# Patient Record
Sex: MALE | Race: WHITE | Employment: OTHER | URBAN - METROPOLITAN AREA
[De-identification: names, ages, dates, MRNs, and addresses within clinical notes are randomized per-mention and may not be internally consistent; named-entity substitution may affect disease eponyms.]

---

## 2018-01-01 ENCOUNTER — APPOINTMENT (OUTPATIENT)
Dept: CT IMAGING | Facility: CLINIC | Age: 73
DRG: 270 | End: 2018-01-01
Attending: STUDENT IN AN ORGANIZED HEALTH CARE EDUCATION/TRAINING PROGRAM
Payer: MEDICARE

## 2018-01-01 ENCOUNTER — APPOINTMENT (OUTPATIENT)
Dept: CARDIOLOGY | Facility: CLINIC | Age: 73
DRG: 270 | End: 2018-01-01
Attending: STUDENT IN AN ORGANIZED HEALTH CARE EDUCATION/TRAINING PROGRAM
Payer: MEDICARE

## 2018-01-01 ENCOUNTER — HOSPITAL ENCOUNTER (INPATIENT)
Facility: CLINIC | Age: 73
LOS: 1 days | DRG: 270 | End: 2018-10-29
Attending: EMERGENCY MEDICINE | Admitting: INTERNAL MEDICINE
Payer: MEDICARE

## 2018-01-01 VITALS — WEIGHT: 220.46 LBS | TEMPERATURE: 88.5 F | HEIGHT: 69 IN | BODY MASS INDEX: 32.65 KG/M2

## 2018-01-01 DIAGNOSIS — I46.9 CARDIAC ARREST (H): ICD-10-CM

## 2018-01-01 LAB
ABO + RH BLD: ABNORMAL
ABO + RH BLD: ABNORMAL
ALBUMIN SERPL-MCNC: 1.4 G/DL (ref 3.4–5)
ALP SERPL-CCNC: 67 U/L (ref 40–150)
ALT SERPL W P-5'-P-CCNC: 49 U/L (ref 0–70)
ANION GAP SERPL CALCULATED.3IONS-SCNC: 20 MMOL/L (ref 3–14)
AST SERPL W P-5'-P-CCNC: 222 U/L (ref 0–45)
B-HCG FREE SERPL-ACNC: 2 [IU]/L (ref 0.86–1.14)
BASE DEFICIT BLDA-SCNC: 22.5 MMOL/L
BASOPHILS # BLD AUTO: 0 10E9/L (ref 0–0.2)
BASOPHILS NFR BLD AUTO: 0 %
BILIRUB SERPL-MCNC: 0.3 MG/DL (ref 0.2–1.3)
BLD GP AB INVEST PLASRBC-IMP: ABNORMAL
BLD GP AB SCN SERPL QL ELUTION: ABNORMAL
BLD GP AB SCN SERPL QL: ABNORMAL
BLD PROD TYP BPU: ABNORMAL
BLD PROD TYP BPU: NORMAL
BLD UNIT ID BPU: 0
BLOOD BANK CMNT PATIENT-IMP: ABNORMAL
BLOOD PRODUCT CODE: NORMAL
BPU ID: NORMAL
BUN SERPL-MCNC: 21 MG/DL (ref 7–30)
BURR CELLS BLD QL SMEAR: ABNORMAL
CA-I BLD-MCNC: 3.6 MG/DL (ref 4.4–5.2)
CA-I BLD-SCNC: 3.3 MG/DL (ref 4.4–5.2)
CA-I BLD-SCNC: 3.4 MG/DL (ref 4.4–5.2)
CA-I BLD-SCNC: 4 MG/DL (ref 4.4–5.2)
CA-I BLD-SCNC: 4.6 MG/DL (ref 4.4–5.2)
CALCIUM SERPL-MCNC: 5.1 MG/DL (ref 8.5–10.1)
CHLORIDE SERPL-SCNC: 122 MMOL/L (ref 94–109)
CO2 BLD-SCNC: 6 MMOL/L (ref 21–28)
CO2 BLD-SCNC: 9 MMOL/L (ref 21–28)
CO2 BLDCOV-SCNC: 21 MMOL/L (ref 21–28)
CO2 BLDCOV-SCNC: 21 MMOL/L (ref 21–28)
CO2 BLDCOV-SCNC: 6 MMOL/L (ref 21–28)
CO2 BLDCOV-SCNC: 6 MMOL/L (ref 21–28)
CO2 SERPL-SCNC: 9 MMOL/L (ref 20–32)
CORTIS SERPL-MCNC: 4.8 UG/DL (ref 4–22)
CREAT SERPL-MCNC: 1.33 MG/DL (ref 0.66–1.25)
CRP SERPL-MCNC: 27 MG/L (ref 0–8)
D DIMER PPP FEU-MCNC: >20 UG/ML FEU (ref 0–0.5)
DAT C3-SP REAG RBC QL: ABNORMAL
DAT IGG-SP REAG RBC-IMP: ABNORMAL
DAT POLY-SP REAG RBC QL: ABNORMAL
DIFFERENTIAL METHOD BLD: ABNORMAL
EOSINOPHIL # BLD AUTO: 0.1 10E9/L (ref 0–0.7)
EOSINOPHIL NFR BLD AUTO: 0.9 %
ERYTHROCYTE [DISTWIDTH] IN BLOOD BY AUTOMATED COUNT: 14.7 % (ref 10–15)
ERYTHROCYTE [SEDIMENTATION RATE] IN BLOOD BY WESTERGREN METHOD: 108 MM/H (ref 0–20)
GFR SERPL CREATININE-BSD FRML MDRD: 52 ML/MIN/1.7M2
GLUCOSE BLD-MCNC: 147 MG/DL (ref 70–99)
GLUCOSE BLD-MCNC: 158 MG/DL (ref 70–99)
GLUCOSE BLD-MCNC: 210 MG/DL (ref 70–99)
GLUCOSE BLD-MCNC: 276 MG/DL (ref 70–99)
GLUCOSE BLDC GLUCOMTR-MCNC: 120 MG/DL (ref 70–99)
GLUCOSE BLDC GLUCOMTR-MCNC: 123 MG/DL (ref 70–99)
GLUCOSE SERPL-MCNC: 125 MG/DL (ref 70–99)
HCO3 BLD-SCNC: 7 MMOL/L (ref 21–28)
HCT VFR BLD AUTO: 16.4 % (ref 40–53)
HCT VFR BLD CALC: 15 %PCV (ref 40–53)
HCT VFR BLD CALC: 21 %PCV (ref 40–53)
HCT VFR BLD CALC: 33 %PCV (ref 40–53)
HCT VFR BLD CALC: <15 %PCV (ref 40–53)
HGB BLD CALC-MCNC: 11.2 G/DL (ref 13.3–17.7)
HGB BLD CALC-MCNC: 5.1 G/DL (ref 13.3–17.7)
HGB BLD CALC-MCNC: 7.1 G/DL (ref 13.3–17.7)
HGB BLD CALC-MCNC: <5 G/DL (ref 13.3–17.7)
HGB BLD-MCNC: 5 G/DL (ref 13.3–17.7)
HGB FREE PLAS-MCNC: 30 MG/DL
KCT BLD-ACNC: 209 SEC (ref 75–150)
KCT BLD-ACNC: 278 SEC (ref 75–150)
LACTATE BLD-SCNC: 11.5 MMOL/L (ref 0.7–2.1)
LACTATE BLD-SCNC: 12 MMOL/L (ref 0.7–2)
LACTATE BLD-SCNC: 9.7 MMOL/L (ref 0.7–2.1)
LDH SERPL L TO P-CCNC: 414 U/L (ref 85–227)
LYMPHOCYTES # BLD AUTO: 3.1 10E9/L (ref 0.8–5.3)
LYMPHOCYTES NFR BLD AUTO: 48.7 %
MAGNESIUM SERPL-MCNC: 1.9 MG/DL (ref 1.6–2.3)
MCH RBC QN AUTO: 29.2 PG (ref 26.5–33)
MCHC RBC AUTO-ENTMCNC: 30.5 G/DL (ref 31.5–36.5)
MCV RBC AUTO: 96 FL (ref 78–100)
METAMYELOCYTES # BLD: 0.3 10E9/L
METAMYELOCYTES NFR BLD MANUAL: 4.7 %
MONOCYTES # BLD AUTO: 0 10E9/L (ref 0–1.3)
MONOCYTES NFR BLD AUTO: 0 %
MYELOCYTES # BLD: 0.1 10E9/L
MYELOCYTES NFR BLD MANUAL: 0.9 %
NEUTROPHILS # BLD AUTO: 2.8 10E9/L (ref 1.6–8.3)
NEUTROPHILS NFR BLD AUTO: 43.9 %
NUM BPU REQUESTED: 4
O2/TOTAL GAS SETTING VFR VENT: 100 %
OXYHGB MFR BLD: 96 % (ref 92–100)
PCO2 BLD: 25 MM HG (ref 35–45)
PCO2 BLD: 28 MM HG (ref 35–45)
PCO2 BLD: 37 MM HG (ref 35–45)
PCO2 BLDV: 26 MM HG (ref 40–50)
PCO2 BLDV: 27 MM HG (ref 40–50)
PCO2 BLDV: 78 MM HG (ref 40–50)
PCO2 BLDV: 79 MM HG (ref 40–50)
PH BLD: 6.99 PH (ref 7.35–7.45)
PH BLD: 7 PH (ref 7.35–7.45)
PH BLD: 7.01 PH (ref 7.35–7.45)
PH BLDV: 6.98 PH (ref 7.32–7.43)
PH BLDV: 6.99 PH (ref 7.32–7.43)
PH BLDV: 7.04 PH (ref 7.32–7.43)
PH BLDV: 7.04 PH (ref 7.32–7.43)
PHOSPHATE SERPL-MCNC: 7.9 MG/DL (ref 2.5–4.5)
PLATELET # BLD AUTO: 26 10E9/L (ref 150–450)
PLATELET # BLD EST: ABNORMAL 10*3/UL
PO2 BLD: 312 MM HG (ref 80–105)
PO2 BLD: 379 MM HG (ref 80–105)
PO2 BLD: 402 MM HG (ref 80–105)
PO2 BLDV: 10 MM HG (ref 25–47)
PO2 BLDV: 13 MM HG (ref 25–47)
PO2 BLDV: 95 MM HG (ref 25–47)
PO2 BLDV: 96 MM HG (ref 25–47)
POIKILOCYTOSIS BLD QL SMEAR: ABNORMAL
POTASSIUM BLD-SCNC: 3.9 MMOL/L (ref 3.4–5.3)
POTASSIUM BLD-SCNC: 4 MMOL/L (ref 3.4–5.3)
POTASSIUM BLD-SCNC: 4.5 MMOL/L (ref 3.4–5.3)
POTASSIUM BLD-SCNC: 5.2 MMOL/L (ref 3.4–5.3)
POTASSIUM SERPL-SCNC: 4.6 MMOL/L (ref 3.4–5.3)
PROMYELOCYTES # BLD MANUAL: 0.1 10E9/L
PROMYELOCYTES NFR BLD MANUAL: 0.9 %
PROT SERPL-MCNC: 2.5 G/DL (ref 6.8–8.8)
RBC # BLD AUTO: 1.71 10E12/L (ref 4.4–5.9)
SAO2 % BLDA FROM PO2: 100 % (ref 92–100)
SAO2 % BLDA FROM PO2: 100 % (ref 92–100)
SAO2 % BLDV FROM PO2: 6 %
SAO2 % BLDV FROM PO2: 8 %
SAO2 % BLDV FROM PO2: 92 %
SAO2 % BLDV FROM PO2: 92 %
SODIUM BLD-SCNC: 136 MMOL/L (ref 133–144)
SODIUM BLD-SCNC: 141 MMOL/L (ref 133–144)
SODIUM BLD-SCNC: 142 MMOL/L (ref 133–144)
SODIUM BLD-SCNC: 146 MMOL/L (ref 133–144)
SODIUM SERPL-SCNC: 152 MMOL/L (ref 133–144)
SPECIMEN EXP DATE BLD: ABNORMAL
TRANSFUSION STATUS PATIENT QL: NORMAL
TROPONIN I BLD-MCNC: 6.45 UG/L (ref 0–0.08)
TROPONIN I SERPL-MCNC: 50.69 UG/L (ref 0–0.04)
WBC # BLD AUTO: 6.3 10E9/L (ref 4–11)

## 2018-01-01 PROCEDURE — 85652 RBC SED RATE AUTOMATED: CPT | Performed by: STUDENT IN AN ORGANIZED HEALTH CARE EDUCATION/TRAINING PROGRAM

## 2018-01-01 PROCEDURE — 84295 ASSAY OF SERUM SODIUM: CPT

## 2018-01-01 PROCEDURE — 33947 ECMO/ECLS INITIATION ARTERY: CPT

## 2018-01-01 PROCEDURE — 86901 BLOOD TYPING SEROLOGIC RH(D): CPT | Performed by: STUDENT IN AN ORGANIZED HEALTH CARE EDUCATION/TRAINING PROGRAM

## 2018-01-01 PROCEDURE — 27210787 ZZH MANIFOLD CR2

## 2018-01-01 PROCEDURE — 82803 BLOOD GASES ANY COMBINATION: CPT | Performed by: INTERNAL MEDICINE

## 2018-01-01 PROCEDURE — 84999 UNLISTED CHEMISTRY PROCEDURE: CPT | Performed by: STUDENT IN AN ORGANIZED HEALTH CARE EDUCATION/TRAINING PROGRAM

## 2018-01-01 PROCEDURE — 84132 ASSAY OF SERUM POTASSIUM: CPT

## 2018-01-01 PROCEDURE — 99285 EMERGENCY DEPT VISIT HI MDM: CPT | Mod: 25 | Performed by: EMERGENCY MEDICINE

## 2018-01-01 PROCEDURE — 86880 COOMBS TEST DIRECT: CPT | Performed by: STUDENT IN AN ORGANIZED HEALTH CARE EDUCATION/TRAINING PROGRAM

## 2018-01-01 PROCEDURE — B2111ZZ FLUOROSCOPY OF MULTIPLE CORONARY ARTERIES USING LOW OSMOLAR CONTRAST: ICD-10-PCS | Performed by: INTERNAL MEDICINE

## 2018-01-01 PROCEDURE — 82947 ASSAY GLUCOSE BLOOD QUANT: CPT

## 2018-01-01 PROCEDURE — 40000501 ZZHCL STATISTIC HEMATOCRIT ED POCT

## 2018-01-01 PROCEDURE — P9016 RBC LEUKOCYTES REDUCED: HCPCS | Performed by: STUDENT IN AN ORGANIZED HEALTH CARE EDUCATION/TRAINING PROGRAM

## 2018-01-01 PROCEDURE — 83615 LACTATE (LD) (LDH) ENZYME: CPT | Performed by: STUDENT IN AN ORGANIZED HEALTH CARE EDUCATION/TRAINING PROGRAM

## 2018-01-01 PROCEDURE — C1769 GUIDE WIRE: HCPCS

## 2018-01-01 PROCEDURE — 25000128 H RX IP 250 OP 636

## 2018-01-01 PROCEDURE — C1874 STENT, COATED/COV W/DEL SYS: HCPCS

## 2018-01-01 PROCEDURE — 86900 BLOOD TYPING SEROLOGIC ABO: CPT | Performed by: INTERNAL MEDICINE

## 2018-01-01 PROCEDURE — 86870 RBC ANTIBODY IDENTIFICATION: CPT | Performed by: STUDENT IN AN ORGANIZED HEALTH CARE EDUCATION/TRAINING PROGRAM

## 2018-01-01 PROCEDURE — 86901 BLOOD TYPING SEROLOGIC RH(D): CPT | Performed by: INTERNAL MEDICINE

## 2018-01-01 PROCEDURE — 27211120 ZZH VENOUS CANNULA 19-21 FRENCH

## 2018-01-01 PROCEDURE — 82330 ASSAY OF CALCIUM: CPT | Performed by: STUDENT IN AN ORGANIZED HEALTH CARE EDUCATION/TRAINING PROGRAM

## 2018-01-01 PROCEDURE — 25000128 H RX IP 250 OP 636: Performed by: STUDENT IN AN ORGANIZED HEALTH CARE EDUCATION/TRAINING PROGRAM

## 2018-01-01 PROCEDURE — 83605 ASSAY OF LACTIC ACID: CPT | Performed by: STUDENT IN AN ORGANIZED HEALTH CARE EDUCATION/TRAINING PROGRAM

## 2018-01-01 PROCEDURE — 5A1522G EXTRACORPOREAL OXYGENATION, MEMBRANE, PERIPHERAL VENO-ARTERIAL: ICD-10-PCS | Performed by: INTERNAL MEDICINE

## 2018-01-01 PROCEDURE — 82803 BLOOD GASES ANY COMBINATION: CPT

## 2018-01-01 PROCEDURE — 86900 BLOOD TYPING SEROLOGIC ABO: CPT | Performed by: STUDENT IN AN ORGANIZED HEALTH CARE EDUCATION/TRAINING PROGRAM

## 2018-01-01 PROCEDURE — 83605 ASSAY OF LACTIC ACID: CPT

## 2018-01-01 PROCEDURE — P9041 ALBUMIN (HUMAN),5%, 50ML: HCPCS

## 2018-01-01 PROCEDURE — 40000497 ZZHCL STATISTIC SODIUM ED POCT

## 2018-01-01 PROCEDURE — C9601 PERC DRUG-EL COR STENT BRAN: HCPCS | Mod: LD

## 2018-01-01 PROCEDURE — 27210806 ZZH SHEATH CR5

## 2018-01-01 PROCEDURE — 86850 RBC ANTIBODY SCREEN: CPT | Performed by: INTERNAL MEDICINE

## 2018-01-01 PROCEDURE — 86316 IMMUNOASSAY TUMOR OTHER: CPT | Performed by: STUDENT IN AN ORGANIZED HEALTH CARE EDUCATION/TRAINING PROGRAM

## 2018-01-01 PROCEDURE — 86905 BLOOD TYPING RBC ANTIGENS: CPT | Performed by: STUDENT IN AN ORGANIZED HEALTH CARE EDUCATION/TRAINING PROGRAM

## 2018-01-01 PROCEDURE — 00000146 ZZHCL STATISTIC GLUCOSE BY METER IP

## 2018-01-01 PROCEDURE — 27210760 ZZH DEVICE INFLATION CR7

## 2018-01-01 PROCEDURE — 25000132 ZZH RX MED GY IP 250 OP 250 PS 637: Mod: GY | Performed by: STUDENT IN AN ORGANIZED HEALTH CARE EDUCATION/TRAINING PROGRAM

## 2018-01-01 PROCEDURE — 27210946 ZZH KIT HC TOTES DISP CR8

## 2018-01-01 PROCEDURE — 27211089 ZZH KIT ACIST INJECTOR CR3

## 2018-01-01 PROCEDURE — 40000502 ZZHCL STATISTIC GLUCOSE ED POCT

## 2018-01-01 PROCEDURE — 027237Z DILATION OF CORONARY ARTERY, THREE ARTERIES WITH FOUR OR MORE DRUG-ELUTING INTRALUMINAL DEVICES, PERCUTANEOUS APPROACH: ICD-10-PCS | Performed by: INTERNAL MEDICINE

## 2018-01-01 PROCEDURE — 5A1935Z RESPIRATORY VENTILATION, LESS THAN 24 CONSECUTIVE HOURS: ICD-10-PCS | Performed by: EMERGENCY MEDICINE

## 2018-01-01 PROCEDURE — 40000076 ZZH STATISTIC IABP MONITORING

## 2018-01-01 PROCEDURE — 85610 PROTHROMBIN TIME: CPT

## 2018-01-01 PROCEDURE — C1725 CATH, TRANSLUMIN NON-LASER: HCPCS

## 2018-01-01 PROCEDURE — 25000128 H RX IP 250 OP 636: Performed by: INTERNAL MEDICINE

## 2018-01-01 PROCEDURE — 70450 CT HEAD/BRAIN W/O DYE: CPT

## 2018-01-01 PROCEDURE — 40000014 ZZH STATISTIC ARTERIAL MONITORING DAILY

## 2018-01-01 PROCEDURE — 99285 EMERGENCY DEPT VISIT HI MDM: CPT | Mod: Z6 | Performed by: EMERGENCY MEDICINE

## 2018-01-01 PROCEDURE — 27211118 ZZH GUIDEWIRE CARDIOHELP KIT 100-150 CM

## 2018-01-01 PROCEDURE — 25000125 ZZHC RX 250: Performed by: INTERNAL MEDICINE

## 2018-01-01 PROCEDURE — 20000004 ZZH R&B ICU UMMC

## 2018-01-01 PROCEDURE — 86316 IMMUNOASSAY TUMOR OTHER: CPT | Performed by: EMERGENCY MEDICINE

## 2018-01-01 PROCEDURE — 85014 HEMATOCRIT: CPT

## 2018-01-01 PROCEDURE — 27211402 ZZH SENSOR NIRS OXIMETER, ADULT

## 2018-01-01 PROCEDURE — 93454 CORONARY ARTERY ANGIO S&I: CPT

## 2018-01-01 PROCEDURE — 82330 ASSAY OF CALCIUM: CPT

## 2018-01-01 PROCEDURE — 86850 RBC ANTIBODY SCREEN: CPT | Performed by: STUDENT IN AN ORGANIZED HEALTH CARE EDUCATION/TRAINING PROGRAM

## 2018-01-01 PROCEDURE — 85014 HEMATOCRIT: CPT | Performed by: INTERNAL MEDICINE

## 2018-01-01 PROCEDURE — 84484 ASSAY OF TROPONIN QUANT: CPT

## 2018-01-01 PROCEDURE — 82330 ASSAY OF CALCIUM: CPT | Performed by: INTERNAL MEDICINE

## 2018-01-01 PROCEDURE — 33949 ECMO/ECLS DAILY MGMT ARTERY: CPT

## 2018-01-01 PROCEDURE — 84484 ASSAY OF TROPONIN QUANT: CPT | Performed by: STUDENT IN AN ORGANIZED HEALTH CARE EDUCATION/TRAINING PROGRAM

## 2018-01-01 PROCEDURE — 27211165 ZZH HYPOTHERMIC CATHETER/KIT CR13

## 2018-01-01 PROCEDURE — 80053 COMPREHEN METABOLIC PANEL: CPT | Performed by: STUDENT IN AN ORGANIZED HEALTH CARE EDUCATION/TRAINING PROGRAM

## 2018-01-01 PROCEDURE — 74176 CT ABD & PELVIS W/O CONTRAST: CPT

## 2018-01-01 PROCEDURE — C1894 INTRO/SHEATH, NON-LASER: HCPCS

## 2018-01-01 PROCEDURE — 27211133 ZZH PAD DEFIB QUICK CR2

## 2018-01-01 PROCEDURE — 82805 BLOOD GASES W/O2 SATURATION: CPT | Performed by: STUDENT IN AN ORGANIZED HEALTH CARE EDUCATION/TRAINING PROGRAM

## 2018-01-01 PROCEDURE — 84132 ASSAY OF SERUM POTASSIUM: CPT | Performed by: INTERNAL MEDICINE

## 2018-01-01 PROCEDURE — 5A02210 ASSISTANCE WITH CARDIAC OUTPUT USING BALLOON PUMP, CONTINUOUS: ICD-10-PCS | Performed by: INTERNAL MEDICINE

## 2018-01-01 PROCEDURE — 84100 ASSAY OF PHOSPHORUS: CPT | Performed by: STUDENT IN AN ORGANIZED HEALTH CARE EDUCATION/TRAINING PROGRAM

## 2018-01-01 PROCEDURE — 40000081 ZZH STATISTIC INSERT IABP

## 2018-01-01 PROCEDURE — A9270 NON-COVERED ITEM OR SERVICE: HCPCS | Mod: GY | Performed by: STUDENT IN AN ORGANIZED HEALTH CARE EDUCATION/TRAINING PROGRAM

## 2018-01-01 PROCEDURE — 82947 ASSAY GLUCOSE BLOOD QUANT: CPT | Performed by: INTERNAL MEDICINE

## 2018-01-01 PROCEDURE — 27211184 ZZH CARDIOHELP CIRCUIT

## 2018-01-01 PROCEDURE — 83051 HEMOGLOBIN PLASMA: CPT | Performed by: STUDENT IN AN ORGANIZED HEALTH CARE EDUCATION/TRAINING PROGRAM

## 2018-01-01 PROCEDURE — 27211119 ZZH ARTERIAL CANNULA 15-17 FRENCH

## 2018-01-01 PROCEDURE — 27211163 ZZH HYPOTHERMIC CATHETER/KIT CR19

## 2018-01-01 PROCEDURE — C1757 CATH, THROMBECTOMY/EMBOLECT: HCPCS

## 2018-01-01 PROCEDURE — C1887 CATHETER, GUIDING: HCPCS

## 2018-01-01 PROCEDURE — 37799 UNLISTED PX VASCULAR SURGERY: CPT

## 2018-01-01 PROCEDURE — 99238 HOSP IP/OBS DSCHRG MGMT 30/<: CPT | Mod: 25 | Performed by: INTERNAL MEDICINE

## 2018-01-01 PROCEDURE — 33952 ECMO/ECLS INSJ PRPH CANNULA: CPT

## 2018-01-01 PROCEDURE — 33967 INSERT I-AORT PERCUT DEVICE: CPT

## 2018-01-01 PROCEDURE — 40000281 ZZH STATISTIC TRANSPORT TIME EA 15 MIN

## 2018-01-01 PROCEDURE — C9600 PERC DRUG-EL COR STENT SING: HCPCS | Mod: LD

## 2018-01-01 PROCEDURE — 27210762 ZZH DEVICE SUTURELESS SECUREMENT EA CR2

## 2018-01-01 PROCEDURE — 85025 COMPLETE CBC W/AUTO DIFF WBC: CPT | Performed by: STUDENT IN AN ORGANIZED HEALTH CARE EDUCATION/TRAINING PROGRAM

## 2018-01-01 PROCEDURE — 3E043XZ INTRODUCTION OF VASOPRESSOR INTO CENTRAL VEIN, PERCUTANEOUS APPROACH: ICD-10-PCS | Performed by: INTERNAL MEDICINE

## 2018-01-01 PROCEDURE — 86860 RBC ANTIBODY ELUTION: CPT | Performed by: STUDENT IN AN ORGANIZED HEALTH CARE EDUCATION/TRAINING PROGRAM

## 2018-01-01 PROCEDURE — 27210136 ZZH KIT CATH ARTERIAL EXT SUPPLY

## 2018-01-01 PROCEDURE — 84295 ASSAY OF SERUM SODIUM: CPT | Performed by: INTERNAL MEDICINE

## 2018-01-01 PROCEDURE — 94002 VENT MGMT INPAT INIT DAY: CPT

## 2018-01-01 PROCEDURE — 27210305 ZZH CATH BALLOON IABP

## 2018-01-01 PROCEDURE — 85347 COAGULATION TIME ACTIVATED: CPT

## 2018-01-01 PROCEDURE — 27211329 ZZ H DEVICE OXYGENATOR QUADROX ECMO, ADULT

## 2018-01-01 PROCEDURE — 85379 FIBRIN DEGRADATION QUANT: CPT | Performed by: STUDENT IN AN ORGANIZED HEALTH CARE EDUCATION/TRAINING PROGRAM

## 2018-01-01 PROCEDURE — 36620 INSERTION CATHETER ARTERY: CPT

## 2018-01-01 PROCEDURE — 27210998 ZZH ACCESS HEART CATH CR6

## 2018-01-01 PROCEDURE — 40000498 ZZHCL STATISTIC POTASSIUM ED POCT

## 2018-01-01 PROCEDURE — 40000275 ZZH STATISTIC RCP TIME EA 10 MIN

## 2018-01-01 PROCEDURE — 99223 1ST HOSP IP/OBS HIGH 75: CPT | Mod: GC | Performed by: INTERNAL MEDICINE

## 2018-01-01 PROCEDURE — 86140 C-REACTIVE PROTEIN: CPT | Performed by: STUDENT IN AN ORGANIZED HEALTH CARE EDUCATION/TRAINING PROGRAM

## 2018-01-01 PROCEDURE — 83735 ASSAY OF MAGNESIUM: CPT | Performed by: STUDENT IN AN ORGANIZED HEALTH CARE EDUCATION/TRAINING PROGRAM

## 2018-01-01 PROCEDURE — 82533 TOTAL CORTISOL: CPT | Performed by: STUDENT IN AN ORGANIZED HEALTH CARE EDUCATION/TRAINING PROGRAM

## 2018-01-01 RX ORDER — NICOTINE POLACRILEX 4 MG
15-30 LOZENGE BUCCAL
Status: DISCONTINUED | OUTPATIENT
Start: 2018-01-01 | End: 2018-01-01 | Stop reason: HOSPADM

## 2018-01-01 RX ORDER — ALBUMIN, HUMAN INJ 5% 5 %
SOLUTION INTRAVENOUS
Status: COMPLETED
Start: 2018-01-01 | End: 2018-01-01

## 2018-01-01 RX ORDER — HEPARIN SODIUM (PORCINE) LOCK FLUSH IV SOLN 100 UNIT/ML 100 UNIT/ML
5-10 SOLUTION INTRAVENOUS EVERY 30 MIN PRN
Status: DISCONTINUED | OUTPATIENT
Start: 2018-01-01 | End: 2018-01-01 | Stop reason: HOSPADM

## 2018-01-01 RX ORDER — ASPIRIN 325 MG
325 TABLET ORAL
Status: COMPLETED | OUTPATIENT
Start: 2018-01-01 | End: 2018-01-01

## 2018-01-01 RX ORDER — MIDAZOLAM (PF) 1 MG/ML IN 0.9 % SODIUM CHLORIDE INTRAVENOUS SOLUTION
1-8 CONTINUOUS
Status: DISCONTINUED | OUTPATIENT
Start: 2018-01-01 | End: 2018-01-01 | Stop reason: HOSPADM

## 2018-01-01 RX ORDER — PIPERACILLIN SODIUM, TAZOBACTAM SODIUM 3; .375 G/15ML; G/15ML
3.38 INJECTION, POWDER, LYOPHILIZED, FOR SOLUTION INTRAVENOUS EVERY 6 HOURS
Status: DISCONTINUED | OUTPATIENT
Start: 2018-01-01 | End: 2018-01-01 | Stop reason: HOSPADM

## 2018-01-01 RX ORDER — PHENYLEPHRINE HCL IN 0.9% NACL 1 MG/10 ML
20-100 SYRINGE (ML) INTRAVENOUS
Status: DISCONTINUED | OUTPATIENT
Start: 2018-01-01 | End: 2018-01-01 | Stop reason: HOSPADM

## 2018-01-01 RX ORDER — ASPIRIN 81 MG/1
81 TABLET, CHEWABLE ORAL DAILY
Status: DISCONTINUED | OUTPATIENT
Start: 2018-01-01 | End: 2018-01-01 | Stop reason: HOSPADM

## 2018-01-01 RX ORDER — IOPAMIDOL 755 MG/ML
320 INJECTION, SOLUTION INTRAVASCULAR ONCE
Status: COMPLETED | OUTPATIENT
Start: 2018-01-01 | End: 2018-01-01

## 2018-01-01 RX ORDER — FENTANYL CITRATE 50 UG/ML
50 INJECTION, SOLUTION INTRAMUSCULAR; INTRAVENOUS EVERY 30 MIN PRN
Status: DISCONTINUED | OUTPATIENT
Start: 2018-01-01 | End: 2018-01-01 | Stop reason: HOSPADM

## 2018-01-01 RX ORDER — CLOPIDOGREL BISULFATE 75 MG/1
75 TABLET ORAL
Status: DISCONTINUED | OUTPATIENT
Start: 2018-01-01 | End: 2018-01-01 | Stop reason: HOSPADM

## 2018-01-01 RX ORDER — DEXTROSE MONOHYDRATE 25 G/50ML
25-50 INJECTION, SOLUTION INTRAVENOUS
Status: DISCONTINUED | OUTPATIENT
Start: 2018-01-01 | End: 2018-01-01 | Stop reason: HOSPADM

## 2018-01-01 RX ORDER — SODIUM NITROPRUSSIDE 25 MG/ML
500 INJECTION INTRAVENOUS EVERY 5 MIN PRN
Status: DISCONTINUED | OUTPATIENT
Start: 2018-01-01 | End: 2018-01-01 | Stop reason: HOSPADM

## 2018-01-01 RX ORDER — IPRATROPIUM BROMIDE AND ALBUTEROL SULFATE 2.5; .5 MG/3ML; MG/3ML
3 SOLUTION RESPIRATORY (INHALATION) EVERY 4 HOURS
Status: DISCONTINUED | OUTPATIENT
Start: 2018-01-01 | End: 2018-01-01

## 2018-01-01 RX ORDER — MAGNESIUM SULFATE HEPTAHYDRATE 500 MG/ML
1-2 INJECTION, SOLUTION INTRAMUSCULAR; INTRAVENOUS EVERY 5 MIN PRN
Status: DISCONTINUED | OUTPATIENT
Start: 2018-01-01 | End: 2018-01-01 | Stop reason: HOSPADM

## 2018-01-01 RX ORDER — METHYLPREDNISOLONE SODIUM SUCCINATE 125 MG/2ML
125 INJECTION, POWDER, LYOPHILIZED, FOR SOLUTION INTRAMUSCULAR; INTRAVENOUS
Status: DISCONTINUED | OUTPATIENT
Start: 2018-01-01 | End: 2018-01-01 | Stop reason: HOSPADM

## 2018-01-01 RX ORDER — HEPARIN SODIUM (PORCINE) LOCK FLUSH IV SOLN 100 UNIT/ML 100 UNIT/ML
5-10 SOLUTION INTRAVENOUS EVERY 30 MIN PRN
Status: DISCONTINUED | OUTPATIENT
Start: 2018-01-01 | End: 2018-01-01

## 2018-01-01 RX ORDER — POTASSIUM CHLORIDE 29.8 MG/ML
20 INJECTION INTRAVENOUS
Status: DISCONTINUED | OUTPATIENT
Start: 2018-01-01 | End: 2018-01-01 | Stop reason: HOSPADM

## 2018-01-01 RX ORDER — HEPARIN SODIUM 10000 [USP'U]/100ML
100-1000 INJECTION, SOLUTION INTRAVENOUS CONTINUOUS PRN
Status: DISCONTINUED | OUTPATIENT
Start: 2018-01-01 | End: 2018-01-01 | Stop reason: HOSPADM

## 2018-01-01 RX ORDER — ARGATROBAN 1 MG/ML
150 INJECTION, SOLUTION INTRAVENOUS
Status: DISCONTINUED | OUTPATIENT
Start: 2018-01-01 | End: 2018-01-01 | Stop reason: HOSPADM

## 2018-01-01 RX ORDER — ARGATROBAN 1 MG/ML
350 INJECTION, SOLUTION INTRAVENOUS
Status: DISCONTINUED | OUTPATIENT
Start: 2018-01-01 | End: 2018-01-01 | Stop reason: HOSPADM

## 2018-01-01 RX ORDER — LIDOCAINE 40 MG/G
CREAM TOPICAL
Status: DISCONTINUED | OUTPATIENT
Start: 2018-01-01 | End: 2018-01-01 | Stop reason: HOSPADM

## 2018-01-01 RX ORDER — LIDOCAINE HYDROCHLORIDE ANHYDROUS AND DEXTROSE MONOHYDRATE .8; 5 G/100ML; G/100ML
1-4 INJECTION, SOLUTION INTRAVENOUS CONTINUOUS
Status: DISCONTINUED | OUTPATIENT
Start: 2018-01-01 | End: 2018-01-01 | Stop reason: HOSPADM

## 2018-01-01 RX ORDER — ASPIRIN 81 MG/1
81-324 TABLET, CHEWABLE ORAL
Status: DISCONTINUED | OUTPATIENT
Start: 2018-01-01 | End: 2018-01-01 | Stop reason: HOSPADM

## 2018-01-01 RX ORDER — CALCIUM CHLORIDE 100 MG/ML
1 INJECTION INTRAVENOUS; INTRAVENTRICULAR EVERY 10 MIN PRN
Status: DISCONTINUED | OUTPATIENT
Start: 2018-01-01 | End: 2018-01-01 | Stop reason: HOSPADM

## 2018-01-01 RX ORDER — NALOXONE HYDROCHLORIDE 0.4 MG/ML
.1-.4 INJECTION, SOLUTION INTRAMUSCULAR; INTRAVENOUS; SUBCUTANEOUS
Status: DISCONTINUED | OUTPATIENT
Start: 2018-01-01 | End: 2018-01-01 | Stop reason: HOSPADM

## 2018-01-01 RX ORDER — HEPARIN SODIUM 1000 [USP'U]/ML
1000-10000 INJECTION, SOLUTION INTRAVENOUS; SUBCUTANEOUS EVERY 5 MIN PRN
Status: DISCONTINUED | OUTPATIENT
Start: 2018-01-01 | End: 2018-01-01 | Stop reason: HOSPADM

## 2018-01-01 RX ORDER — CLOPIDOGREL BISULFATE 75 MG/1
300-600 TABLET ORAL
Status: DISCONTINUED | OUTPATIENT
Start: 2018-01-01 | End: 2018-01-01 | Stop reason: HOSPADM

## 2018-01-01 RX ORDER — ADENOSINE 3 MG/ML
12-12000 INJECTION, SOLUTION INTRAVENOUS
Status: DISCONTINUED | OUTPATIENT
Start: 2018-01-01 | End: 2018-01-01 | Stop reason: HOSPADM

## 2018-01-01 RX ORDER — MAGNESIUM SULFATE HEPTAHYDRATE 40 MG/ML
4 INJECTION, SOLUTION INTRAVENOUS EVERY 4 HOURS PRN
Status: DISCONTINUED | OUTPATIENT
Start: 2018-01-01 | End: 2018-01-01 | Stop reason: HOSPADM

## 2018-01-01 RX ORDER — ALBUTEROL SULFATE 90 UG/1
6 AEROSOL, METERED RESPIRATORY (INHALATION) EVERY 4 HOURS
Status: DISCONTINUED | OUTPATIENT
Start: 2018-01-01 | End: 2018-01-01 | Stop reason: HOSPADM

## 2018-01-01 RX ORDER — ATROPINE SULFATE 0.1 MG/ML
.5-1 INJECTION INTRAVENOUS
Status: DISCONTINUED | OUTPATIENT
Start: 2018-01-01 | End: 2018-01-01 | Stop reason: HOSPADM

## 2018-01-01 RX ORDER — CLOPIDOGREL BISULFATE 75 MG/1
75 TABLET ORAL DAILY
Status: DISCONTINUED | OUTPATIENT
Start: 2018-10-30 | End: 2018-01-01

## 2018-01-01 RX ORDER — MEPERIDINE HYDROCHLORIDE 50 MG/ML
25-50 INJECTION INTRAMUSCULAR; INTRAVENOUS; SUBCUTANEOUS
Status: DISCONTINUED | OUTPATIENT
Start: 2018-01-01 | End: 2018-01-01

## 2018-01-01 RX ORDER — CALCIUM CHLORIDE 100 MG/ML
1 INJECTION INTRAVENOUS; INTRAVENTRICULAR EVERY 10 MIN PRN
Status: DISCONTINUED | OUTPATIENT
Start: 2018-01-01 | End: 2018-01-01

## 2018-01-01 RX ADMIN — VASOPRESSIN 4 UNITS/HR: 20 INJECTION INTRAVENOUS at 03:41

## 2018-01-01 RX ADMIN — ALBUMIN HUMAN 87.5 G: 0.05 INJECTION, SOLUTION INTRAVENOUS at 02:45

## 2018-01-01 RX ADMIN — EPINEPHRINE 0.3 MCG/KG/MIN: 1 INJECTION PARENTERAL at 03:41

## 2018-01-01 RX ADMIN — IOPAMIDOL 320 ML: 755 INJECTION, SOLUTION INTRAVASCULAR at 01:45

## 2018-01-01 RX ADMIN — SODIUM CHLORIDE 3000 ML: 9 INJECTION, SOLUTION INTRAVENOUS at 03:30

## 2018-01-01 RX ADMIN — NOREPINEPHRINE BITARTRATE 0.4 MCG/KG/MIN: 1 INJECTION INTRAVENOUS at 03:41

## 2018-01-01 RX ADMIN — TICAGRELOR 180 MG: 90 TABLET ORAL at 01:26

## 2018-01-01 RX ADMIN — ASPIRIN 325 MG ORAL TABLET 325 MG: 325 PILL ORAL at 01:26

## 2018-01-01 RX ADMIN — PHENYLEPHRINE HYDROCHLORIDE 3 MCG/KG/MIN: 10 INJECTION, SOLUTION INTRAMUSCULAR; INTRAVENOUS; SUBCUTANEOUS at 03:44

## 2018-01-01 ASSESSMENT — ACTIVITIES OF DAILY LIVING (ADL): ADLS_ACUITY_SCORE: 20

## 2018-10-29 PROBLEM — I46.9 CARDIAC ARREST (H): Status: ACTIVE | Noted: 2018-01-01

## 2018-10-29 NOTE — H&P
[facility]  Cardiac ICU H&P  2018          H&P:   Iker Machuca is a 73 year old male who was admitted on 10/28/2018 following a witnessed cardiac arrest in his home.  His wife saw him collapse and called 911.  He hit his head when he fell and has a laceration.  EMS arrived, found him in PEA and initiated CPR with Jericho; he converted to VF, was shocked 5 times, and converted to asystole in which he has remained.  EMS had placed an IO in left tibia as well as an orophagyngeal I-Gel airway.    Witnessed arest (y/n): yes  Home or public location (y/n): home  Bystander CPR (y/n):  Time of 911 call:   Initial rhythm: PEA -> VF -> shock -> asystole  Did they have intermittent ROSC (y/n): no  # of shocks: 5  Epi:  4 mg  Amio:  450 mg  Bicarb: 2 amps  EtCO2 en route: ?  O2 sat en route: 79    Initial rhythm in the cath lab: asystole  First AB.97/52.8/67/12.2  ECMO cannula size: 17 Fr arterial & 25 Fr venous  Meds given in the cath lab:    Coronary angiography demonstrated complete occlusion of the proximal LAD, RCA, and LCx (appeared to be a ).  Following thrombus aspiration, stents were placed in the proximal LAD (3.0x32), first diagonal (2.5x20), proximal RCA (2.0x20) and mid RCA (2.0x30).  Following PCI the LAD had slow flow.    Despite coronary reperfusion, a perfusing rhythm could not be obtained and the patient remained in asystole throughout the case and afterward.  We spoke with the family, clearly explained his clinical condition & the supportive measures taken, and recommended cessation of resuscitative efforts due to his dismal prognosis.  His son appeared to understand but his wife, still grappling with the evening's events, wanted to continue supportive care through the night and decide in the morning.         Medications:   I have reviewed this patient's current medications.    No past medical/surgical/family history is available.  He is .         Review  of Systems:   Could not be obtained due to patient's unresponsiveness.         Physical Exam:   Exam and Labs by System  Neuro: not on sedation or paralytics  Exam:   Sedated; orally intubated; bleeding from right orbita; cannot open eyelids due to edema       Cardiovascular:    Meds:  ASA 81mg Qday, ticagrelor 90 mg BID, heparin gtt for ECMO  Exam:    No heart sounds.  Cool extremities.  No peripheral edema.     Pulm:  Crackles in both anterior lung fields.  Subcutaneous hemorrhage is visible under the site of Jericho CPR.  Vent Settings:  FiO2 (%): 100 %  Resp: 12    Recent Labs  Lab 10/29/18  0214   PH 6.99*   PCO2 28*   PO2 312*   HCO3 7*   O2PER 100.0       Renal:        Recent Labs  Lab 10/28/18  2138      POTASSIUM 3.9   *     GI:   Meds: Protonix 40mg IV Qday  Exam:    Fernandez in place  Distended  Absent bowel sounds     Heme/ID: Bleeding from posterior cranial laceration, right orbit, and nares.  Recent Labs   Lab Test  10/28/18   2138   HGB  11.2*     Endo:   Meds:  Recent Labs  Lab 10/28/18  2138   *               Data:     CMP  Recent Labs  Lab 10/28/18  2138      POTASSIUM 3.9   *     CBC  Recent Labs  Lab 10/28/18  2138   HGB 11.2*     INR 2.0    CT head, chest, abdomen, and pelvis are performed; read is pending.  I reviewed the images briefly with the radiologist.  On a first glance he reported evidence of cerebral edema diffusely without intracranial hemorrhage; small pleural effusions; moderate pericardial effusion; bilateral pulmonary edema; significant gaseous distension of the stomach and bowel, no retroperitoneal or intra-abdominal hemorrhage, and appropriate positioning of the balloon pump and ECMO cannulae.             Assessment and Plan:   Iker Machuca is a 73 year old male who was admitted on 10/28/2018.  On the basis of persistent asystole despite revascularization, severe hypothermia despite restoration of circulation, and persistent circulatory shock his  prognosis is abysmal.  His wife is struggling to cope and has requested that we continue supportive care through the night.    Neurology: Intubated, sedated, paralyzed. He was himself hypothermic to 30 degrees and actually had to be warmed to maintain a goal of 34-36 degrees.  -- hold sedation unless he becomes more alert and appears uncomfortable   Cardiovascular / Hemodynamics: Refractory VF arrest most presumed to be secondary to ACS, with coronary angiography demonstrating complete occlusion of the LAD, LCx, and RCA.  JACKELIN to LAD, RCA, and first diagonal.  Peripheral V-A ECMO inserted for cardiogenic shock.  TTE: ordered  EKG: asystole  --wean pressors if able  --echo tomorrow pending ongoing discussion of goals/prognosis  --continue ASA 81mg and ticagrelor 90mg BID  --hold temp at 34 given bleeding  --ACT goal 180-200  --hold lipitor for now given likely hepatic injury during arrest  --hold ACE/ARB for now given likely reduced renal fxn after arrest  --holding beta blocker given shock   Pulmonary: Respiratory failure due to shock and unresponsiveness  Oral ETT.  Will wean vent requirements as able.  CXR: Lines in stable position.  --daily CXR  --Q2h ABGs for now  --scheduled duonebs if signs of lung dz, currently PRN   GI and Nutrition: No known medical hx.   --monitor LFTs  --NPO while cooled - nutrition consult pending feeding tube placement once he is warmed   --bowel regimen - on hold for now due to hypothermia  --GI Prophylaxis: PPI IV   Renal, Fluid and Electrolytes: --monitor urine output  --maintain K>3 and Mg>2    Infectious Disease: No signs of infection. Leukocytosis c/w arrest. Blood cultures collected.   --vancomycin/zosyn x5 days for ECMO  --daily blood cultures  --monitor for signs of infection given cooling, lines, and leukocytosis   Hematology and Oncology: Receiving heparin for ECMO and ASA/ticagrelor for JACKELIN.   --cryo PRN fibrinogen < 200; FFP for INR >2  --Transfuse for Hgb<10  --heparin gtt  for ECMO with ACT goal 160-180  --US LE w/ arterial duplex per ECMO protocol   --DVT PPX: Heparin as above   Endocrinology: No known medical history. BG elevated.  --insulin gtt  --stress dose steroids due to persistent hypotension - hydrocort 50 mg IV Q8h  --f/u HgbA1c    Lines: R femoral arterial and venous ECMO cannulae October 28, 2018  L femoral arterial line October 28, 2018  Radial arterial line October 28, 2018  ETT October 28, 2018  Fernandez catheter October 28, 2018  OG tube October 28, 2018  Restraint: needed    Current lines are required for patient management     Family was updated by Dr. Jackman.    I discussed the patient with Dr. Ashish Jackman.    Ender Barr MD  Cardiovascular disease fellow    October 28, 2018    I have seen and examined the patient and agree with the finding and plan.       Ashish Jackman MD  Cardiology-[facility]  [phone]

## 2018-10-29 NOTE — PLAN OF CARE
Problem: Patient Care Overview  Goal: Plan of Care/Patient Progress Review  Outcome: Declining  Admitted/transferred cath lab   Reason for admission/transfer: witnessed PEA arrest with fall, EMS VFib and asystole see notes, cannulated for  ECMO   Patient status upon admission/transfer: critical; bleeding with low flow on circuit and multiple vasoactive medications  Interventions: 1.75L Albumin and multiple NS bolus given through pressure bags, vasoactive medications increased,  blood product orders released and sent for, care discussed with family by team bedside  Plan: transition to less aggressive cares while attempting to maintain current critical status while awaiting family arrivals to say farayaan   2 RN skin assessment: completed by not fully completed  Upon arrival due to critical condition of patient and decision to transition to comfort and withdrawal aggressive cares  Result of skin assessment and interventions/actions: patient was bathed in preporation for family to view and be bedside, multiple lacerations, bruising, lines, and bleeding sites present  Height, weight, drug calc weight: done   Patient belongings: wedding ring sent with wife, clothing cut off in procedural area  MDRO education (if applicable): unknown , mrsa swab sent , cultures not drawn prior to death    Patient was pronounced dead at 0419, October 29, 2018 see Physicians note . Wife Rae and other family bedside with  Dr. Schultz of cardiology present. [employer] contacted reference number [id number]. Wife'sRae, contact information [phone]. Son'sRubin, contact information [phone]. Wife and family bedside saying keshav presently. Family went home, body sent with security at 0655.    Patient name update last name Seamus, middle name Brigette.      Please see MAR, flow sheets, and remainder of chart for further details. .

## 2018-10-29 NOTE — PROGRESS NOTES
ECLS Cannulation Note:    Date on: 10/28/2018  Time on: 21:59 PM  Surgeon: ANAHY    Arterial Cannula: 17 Fr. In the Right Femoral Artery      Venous Cannula: 25 Fr. In the Right Femoral Vein      ECMO components include [employer] Lot # [id number], Quadrox Oxygenator- Lot # [id number]    Cannulation was performed in the Cath Lab, placement was verified by fluoroscopy, cannulas are in good position.  ISTAT and blood cooler are at bedside.     Rene Blake, ROBIN  10/29/2018 3:22 AM

## 2018-10-29 NOTE — PROGRESS NOTES
[facility], [place]  Procedure Note           Intubation:       Iker Machuca  MRN# [id number]   October 28, 2018, 9:51 PM Indication: Cardiac arrest           Patient intubated at: October 28, 2018, 9:51 PM   Family informed of: Why intubation was required   Informed consent: Obtained   Cervical spine: Was stabilized during the procedure   Sedative medication: Was administered during the procedure   Technique used: Fiberoptic visualization with GlideScope   Endotracheal tube size: 8.0 cm with cuff   Number of attempts: 1   Placement confirmed by: Auscultation of bilateral breath sounds  Visualization of bilateral chest wall rise  End-tidal CO2 monitor   ET tube repositioning: Was not performed   Tube secured at: 27 cm      This procedure was performed without difficulty and he tolerated the procedure well with no complications.      Recorded by Rene Blake

## 2018-10-29 NOTE — ED TRIAGE NOTES
Pt BIBA after a fall and cardiac arrest around 2035. Original rhythm was PEA that turned into Vfib. He received 5 shocks, 450mg amio, 4 epi. All rhythm checks by EMS was asystole. . Per EMS pt has lac on head. Original rhythm check in ED was asystole.

## 2018-10-29 NOTE — ED PROVIDER NOTES
[place] EMERGENCY DEPARTMENT ([facility])  10/28/18 ED 1 9:31 PM  History     Chief Complaint   Patient presents with     Cardiac Arrest     The history is provided by the patient and medical records.     Iker Machuca is a 73 year old male who presents via EMS for cardiac arrest. He is new to the [place] system, has never been here in the past. History provided by [facility] EMS crew as patient is obtunded.  Patient was at home with his wife when he suffered a witnessed loss of consciousness and fall.  He fell backwards, striking the back of his head and sustaining a cut.  Wife called 911 and paramedics arrived and initiated Jericho CPR right away.  EKG was notable for PEA rhythm which went into V. fib.  He was given 5 shocks, 450 of amiodarone 4 epinephrine and 2 bicarb.  They performed 5 rhythm checks on the way to our ED and each time is notable for asystole, last rhythm check was at 2128 with asystole.  An IO was placed in his left tibia as well as a second line in his right antecubital 18-gauge.  An i-Gel oropharyngeal area was placed as well. Blood sugar was 246 They contacted Dr. Almeida prior to transporting patient here for further evaluation.  Patient's wife and son are en route as well.  Son is a [place] .    I have reviewed the Medications, Allergies, Past Medical and Surgical History, and Social History in the [facility] system.  PAST MEDICAL HISTORY: History reviewed. No pertinent past medical history.    PAST SURGICAL HISTORY: History reviewed. No pertinent surgical history.    FAMILY HISTORY: No family history on file.    SOCIAL HISTORY:   Social History   Substance Use Topics     Smoking status: Unknown If Ever Smoked     Smokeless tobacco: Never Used     Alcohol use Not on file       There are no discharge medications for this patient.       Not on File     Review of Systems   Unable to perform ROS: Acuity of condition       Physical Exam   BP:  (RANI)  Heart Rate: (!)  204 (with jericho)  SpO2:  (RANI)      Physical Exam   Constitutional:   CPR in progress with Jericho.  Igel in place  No spontaneous respirations  No pulse   Eyes:   No response   Cardiovascular:   No pulse   Pulmonary/Chest:   apnic   Neurological:   No reflexes       ED Course     ED Course     Procedures         9:36 PM pulse check, no pulse       Critical Care time:  was 20 minutes for this patient excluding procedures.            Labs Ordered and Resulted from Time of ED Arrival Up to the Time of Departure from the ED   GLUCOSE BY METER - Abnormal; Notable for the following:        Result Value    Glucose 123 (*)     All other components within normal limits   ISTAT GASES ELEC ICA GLUC IOANA POCT - Abnormal; Notable for the following:     Ph Venous 7.04 (*)     PCO2 Venous 78 (*)     PO2 Venous 13 (*)     Glucose 276 (*)     Hemoglobin 11.2 (*)     Hematocrit - POCT 33 (*)     All other components within normal limits   ISTAT  GASES LACTATE IOANA POCT - Abnormal; Notable for the following:     Ph Venous 7.04 (*)     PCO2 Venous 79 (*)     PO2 Venous 10 (*)     Lactic Acid 11.5 (*)     All other components within normal limits   ISTAT INR POCT - Abnormal; Notable for the following:     ISTAT INR 2.0 (*)     All other components within normal limits   TROPONIN POCT - Abnormal; Notable for the following:     Troponin I 6.45 (*)     All other components within normal limits            Assessments & Plan (with Medical Decision Making)   This is a 73-year-old male brought in by [facility] as a cardiac arrest.  Here he is found to be apneic and pulseless with a asystolic rhythm.  He had already been shocked 5 times in the field.  Here in the emergency room his I job was replaced with an endotracheal tube.  Labs were drawn and patient was then emergently sent to the Cath Lab for ECMO management.    I have reviewed the nursing notes.    I have reviewed the findings, diagnosis, plan and need for follow up with the  patient.    There are no discharge medications for this patient.      Final diagnoses:   None     Chantal HYATT, am serving as a trained medical scribe to document services personally performed by Sean Baez MD based on the provider's statements to me on October 28, 2018.  This document has been checked and approved by the attending provider.    ISean MD, was physically present and have reviewed and verified the accuracy of this note documented by Chantal Ellsworth, medical scribe.     10/28/2018   [facility], [place], EMERGENCY DEPARTMENT     Sean Baez MD  10/29/18 0036

## 2018-10-29 NOTE — DISCHARGE SUMMARY
[facility], [place]    Death Summary  Cardiology: [facility]SI service    Date of Admission:  10/28/2018  Date of Death:         10/29/2018  Provider Completing Death Summary: Ender Barr    Discharge Diagnoses   Cardiac arrest (H)    History of Present Illness   Iker Machuca is an 73 year old male hospitalized after attempted resuscitation of a witnessed PEA -> VF -> asystolic cardiac arrest.  His wife saw him collapse and called 911.  Paramedics found him in PEA.  He received 5 shocks and 450 mg of amiodarone while in VF.  He received 4 mg of epinephrine in total.  His pH on arrival was 6.97 and his lactate was 8.    Hospital Course   Upon presentation to the emergency department Mr. Machuca was transferred to the cardiac catheterization lab, was placed on VA-ECMO, and underwent coronary angiography which demonstrated complete occlusion of the proximal LAD, RCA, and LCx (appeared to be a ).  Following aspiration thrombectomy, stents were placed in the proximal LAD (3.0x32), first diagonal (2.5x20), proximal RCA (2.0x20) and mid RCA (2.0x30).  However, the LAD still had slow flow following PCI.   Despite coronary reperfusion, a perfusing rhythm could not be obtained and the patient remained in asystole throughout the case and afterward.  We spoke with the family, clearly explained his clinical condition & the supportive measures taken, and recommended cessation of resuscitative efforts due to his dismal prognosis.  His son appeared to understand but his wife, still grappling with the evening's events, wanted to continue supportive care through the night and decide in the morning.  Shortly following arrival to the cardiovascular intensive care unit his blood pressure fell steadily to MAPs in the 30s-50s despite aggressive fluid resuscitation and escalation of vasopressor support to include epinephrine, norepinephrine, and vasopressin at maximum doses as well as the addition of  phenylephrine.  CT imaging of the chest demonstrated a moderate-sized pericardial effusion and no pneumothoraces.    In light of the dismal prognosis portended by his absence of a perfusing cardiac rhythm for several hours we felt that pericardiocentesis to address any contribution of tamponade still would not alter his prognosis.  I updated the family about his deteriorating condition, answered their questions, and recommended cessation of hemodynamic support.  His wife was in agreement about withdrawing support.    He was pronounced  at 4:18 AM.    Cause of death: Cardiac arrest    Ender Barr MD  Cardiovascular disease fellow      Data   Most Recent 3 CBC's:  Recent Labs   Lab Test  10/29/18   0216  10/28/18   2138   WBC  6.3   --    HGB  5.0*  11.2*   MCV  96   --    PLT  26*   --       Most Recent 3 BMP's:  Recent Labs   Lab Test  10/29/18   0216  10/28/18   2138   NA  152*  141   POTASSIUM  4.6  3.9   CHLORIDE  122*   --    CO2  9*   --    BUN  21   --    CR  1.33*   --    ANIONGAP  20*   --    AGNES  5.1*   --    GLC  125*  276*     Most Recent 2 LFT's:  Recent Labs   Lab Test  10/29/18   0216   AST  222*   ALT  49   ALKPHOS  67   BILITOTAL  0.3     Most Recent INR's and Anticoagulation Dosing History:  Anticoagulation Dose History     Recent Dosing and Labs Latest Ref Rng & Units 10/28/2018    INR-ISTAT 0.86 - 1.14 2.0(H)        Most Recent 3 Troponin's:  Recent Labs   Lab Test  10/29/18   0216  10/28/18   2138   TROPI  50.690*   --    TROPONIN   --   6.45*     Most Recent Cholesterol Panel:No lab results found.  Most Recent 6 Bacteria Isolates From Any Culture (See EPIC Reports for Culture Details):No lab results found.  Most Recent TSH, T4 and A1c Labs:No lab results found.  Results for orders placed or performed during the hospital encounter of 10/28/18   CT Head w/o Contrast    Narrative    CT HEAD W/O CONTRAST 10/29/2018 2:06 AM    Provided History: Cardiac Arrest, Anoxic Encephalopathy;    ICD-10:    Comparison: None.    Technique: Using multidetector thin collimation helical acquisition  technique, axial, coronal and sagittal CT images from the skull base  to the vertex were obtained without intravenous contrast.     Findings:    Diffuse retention of dense contrast media within the intracranial  vessels, and dural venous sinuses and layering within the bilateral  ventricles. No definite intracranial hemorrhage, mass effect, or  midline shift. The ventricles are proportionate to the cerebral sulci.  Diffuse loss of gray-white matter differentiation and cerebral sulcal  effacement. The basal cisterns are patent.    Endotracheal intubation. Scattered opacification of ethmoid air cells  and right greater than left maxillary sinuses. Bilateral hearing aids.  Likely chronic nasal bone deformities. Mild soft tissue thickening  over the right posterior (series 5, image 2). Trace scalp thickening  over the left parietal scalp. Mastoid air cells are clear.       Impression    Impression:   1. Diffuse loss of gray-white matter differentiation consistent with  diffuse anoxic brain injury.  2. Retained contrast within the intracranial vessels/dural sinuses and  ventricles secondary to recent catheterization.    Findings were discussed with Dr. Montano by Dr. Hernandez at 2:00 AM on  10/29/2018.   CT Chest Abdomen Pelvis w/o Contrast    Narrative    EXAMINATION: CT CHEST ABDOMEN PELVIS W/O CONTRAST, 10/29/2018 2:06 AM    TECHNIQUE:  Helical CT images from the thoracic inlet through the  symphysis pubis were obtained  without contrast. Contrast dose: None    COMPARISON: None    HISTORY: Cardiac Arrest:  concern for pulmonary edema and possible  abdominal bleeding post procedure;     FINDINGS:    Lines/tubes:   Endotracheal tube just above the aldo. Gastric tube tip within  stomach, sidehole at the GE junction. ECMO cannula in the superior  right atrium. Left lower approach central venous catheter in the  hepatic  IVC. Left lower approach intra-aortic balloon pump with the  tip in the descending thoracic aorta, balloon, is the bilateral renal  ostia and terminates in the infrarenal abdominal aortic aneurysm  measuring 4.6 cm, additional fusiform dilatation inferior to this  measuring 3.5 cm. Right lower extremity vascular sheath in the  external iliac artery. Fluid and stranding in the left greater than  right inguinal region, presumably secondary to multiple bilateral  catheter placement.    Chest:   Diffuse pericardial pneumopericardium measuring 2 cm  circumferentially. Coronary stents. Coronary vascular calcified  plaque. Thoracic aorta is within normal limits. Pulmonary artery is  within normal limits. Heterogeneous thyroid. Diffuse stranding  throughout the mediastinum. Numerous prominent mediastinal lymph  nodes, for example 1.2 cm right paratracheal lymph node. Fluid  collection in the right pericardiophrenic space measuring 3.0 x 10.4  cm (series 5, image 75). Mild effacement of the central  tracheobronchial tree. Calcified left hilar lymph nodes. Diffuse  bronchial wall thickening. No bronchiectasis. Scattered streaky areas  of peribronchial vascular consolidation and interlobular septal  thickening. Small bilateral pleural effusions with associated  atelectasis. Significant respiratory motion. Soft tissue stranding of  the presternal soft tissues and bilateral chest wall. Stranding within  the bilateral axilla.    Abdomen and pelvis:   Liver and spleen are unremarkable. Small accessory splenule. Mild  layering vicarious excretion within the gallbladder. Diffuse  peripancreatic and central mesenteric stranding and scattered  nonenlarged lymph nodes. Symmetric renal contrast excretion. Bilateral  pelvocaliectasis versus peripelvic renal cysts. Bladder is  decompressed by Fernandez catheter. No significant intra-abdominal free  fluid. No intra-abdominal free air. Distended fluid and gas-filled  stomach. Distended fluid  and gas-filled small bowel. Mild distention  of the proximal colon. Moderate colonic stool. No significant    Bones and soft tissues: Mild multilevel degenerative changes.      Impression    IMPRESSION:   1. Large pericardial effusion/hemopericardium. Correlate clinically  for cardiac top 9. Ill-defined right pericardiophrenic fluid.  2. Diffuse interstitial and central airspace opacities concerning for  pulmonary edema. Small bilateral pleural effusions.  3. Diffuse small and proximal large bowel distention, likely secondary  to resuscitative efforts.  4. Diffuse mesenteric/peripancreatic stranding, presumably secondary  to resuscitative efforts. Less likely inflammatory.  5. Intra-aortic balloon pump extends over the bilateral renal artery  ostia. Bilateral pelvocaliectasis versus peripelvic renal cysts.       I have seen and examined the patient and agree with the finding and plan.       Ashish Jackman MD  Cardiology-[facility]  [phone]

## 2018-10-29 NOTE — PROGRESS NOTES
[facility], [place]  Procedure Note          Intra-Aortic Balloon Pump Insertion:       Iker Machuca  MRN# [id number]   October 29, 2018, 11:45 pM Indication: Cardiogenic shock           Procedure performed: October 28, 2018, 11:46 PM   Location: Cath Lab   Catheter size: 50 cc       Catheter placed: Left femoral artery   Complications:: None   Assist initiated: 1:1 ratio   Percent augmentation: 100   Timing adjusted: Adjusted to achieve optimal assist   Verification of position: Fluoroscopy  Confirmed   Comments: None      Recorded by Rene Blake

## 2018-10-29 NOTE — PROGRESS NOTES
Death Note  Patient: Iker Machuca   1945  Date of Service: 10/29/2018    Called to bedside as family determined they were ready to withdraw cares. Family wanted to present in the room at the time.     Pupils dilated, fixed. Non reactive. Corneal reflexes absent.   Patient non responsive to painful stimuli.  No carotid or radial pulses palpated.   No spontaneous respirations, no heart sounds to auscultation.   Time of Death 4:18am    Mariana Jacobs   Internal Medicine PGY3    Ender Barr MD  CV fellow

## 2018-10-29 NOTE — PROGRESS NOTES
ECLS Discontinuation Note:    Family decided to withdraw support, ECLS was discontinued 10/29/2018 at 04:18 am.    Rene Blake RRT  10/29/2018 6:40 AM

## 2018-10-29 NOTE — PROGRESS NOTES
Cardiology cross cover update    Patient's blood pressure fell steadily to MAPs in the 30s-50s despite aggressive fluid resuscitation and escalation of vasopressor support to include epinephrine, norepinephrine, and vasopressin at maximum doses as well as the addition of phenylephrine.  CT imaging of the chest demonstrated a moderate-sized pericardial effusion and no pneumothoraces.    Impression:  Circulatory shock despite VA-ECMO due to vasoplegia and/or cardiac tamponade despite fluids and significant vasopressor support.    Plan:  In light of the dismal prognosis portended by his absence of a perfusing cardiac rhythm for several hours we felt that pericardiocentesis to address any contribution of tamponade still would not alter his prognosis.  I updated the family about his deteriorating condition, answered their questions, and recommended cessation of hemodynamic support.  The family was very appreciative of our efforts and decided to withdraw support.    I reviewed the above with Dr. Jackman, including the recommendation to withdraw support, prior to speaking with the family.    Ender Barr MD  Cardiovascular disease fellow

## 2018-10-30 LAB — MISCELLANEOUS TEST: NORMAL

## 2018-10-31 LAB — NSE SERPL-MCNC: 43.9 UG/L (ref 3.7–8.9)

## 2018-12-09 NOTE — PROCEDURES
PROCEDURES PERFORMED:   Coronary Angiography  Percutaneous Coronary Intervention  IABP  ECMO  CPR  Hypothermia    PHYSICIANS:  Attending Physician: Ashish Jackman MD  Interventional Cardiology Fellow: None  Cardiology Fellow: None    INDICATION:   Iker Machuca is a 73 year old male who was admitted on 10/28/2018 following a witnessed cardiac arrest in his home.  His wife saw him collapse and called 911.  He hit his head when he fell and has a laceration.  EMS arrived, found him in PEA and initiated CPR with Jericho; he converted to VF, was shocked 5 times, and converted to asystole in which he has remained.       Procedure:     Patient was emergently brought to the Cath Lab.  Patient was draped and prepped in a sterile fashion in the right femoral vein and arterial access was obtained.  A 17 Samoan arterial cannula and a 21 5 Samoan venous cannula was placed in the right femoral artery and venous access sites and ECMO was initiated immediately.  At this time the Jericho CPR device was discontinued.  Access was obtained in the left femoral artery and the left femoral vein.  Using the left femoral artery access using a 6 Samoan diagnostic catheters a diagnostic angiography was performed.    Diagnostic angiography:    Left main coronary artery: Minimal disease with 10% Stenosis  LAD: Thrombotic occlusion of the proximal vessel was noted.  CX: Thrombotic occlusion of the proximal vessel was noted.  RCA: Thrombotic occlusion of the proximal vessel was noted.     Percutaneous coronary intervention.    Left main coronary artery was engaged with an XB 3.5 guiding catheter.  LAD was wired using Paola blue wire.  After pre-dilating the proximal LAD lesion with a 2.5 x 12 mm emerge balloon LAD was stented using a 3.0 x 32 mm Synergy stent.  Wire was repositioned in the first diagonal and was directly stented using a 2.5 x 20 mm Synergy stent.  Overall patient had only had to me one floor in both stented segments.   Multiple doses of nitrite and adenosine was given.  The end of the procedure patient had a SAGAR II flow in the LAD and the diagonal.    RCA was engaged with a JR4 guiding catheter.  RCA lesion was wired using a Si[place] blue wire.  Mid RCA lesion was predilated using a 2.0 x 20 mm emerge balloon and mid to proximal RCA was stented using a 2.0 x 30, 2.0 x 22 millimeters Hamilton stent.    Procedure: Intra-aortic balloon pump placement    Using a left femoral arterial access 50 cc intra-aortic balloon pump was placed without any complications.    EXTRACORPOREAL MEMBRANE OXYGENATION CANNULATION:  A 17 Fr ECMO cannula was inserted into the right common femoral artery and a 25 Fr cannula was inserted into the right common femoral vein.  The arterial cannula was positioned in the iliac artery and the venous canula was positioned across the RA.  The ECMO circuit was primed, closely inspected to ensure no bubbles present and ECMO was initiated.  An anterograde perfusion 9 Fr sheath was inserted in the right superficial femoral artery.    CPR:    CPR was performed using the Jericho device.    Hypothemia Protocol:    Hypothermia protocol was initiated.  A 9.3 Fr Thermoguard was placed in the left femoral vein.      COMPLICATIONS:  1. None    SUMMARY:   Three vessel CAD RCA, LAD and LCX  Successful deployment of a drug eluting stent to the LAD & RCA  IABP  ECMO                                                        I was present for the entire procedure.     Ashish Jackman M.D.  Interventional Cardiology  [facility]  Pager: [phone]

## (undated) RX ORDER — ADENOSINE 3 MG/ML
INJECTION, SOLUTION INTRAVENOUS
Status: DISPENSED
Start: 2018-01-01

## (undated) RX ORDER — ASPIRIN 325 MG
TABLET ORAL
Status: DISPENSED
Start: 2018-01-01